# Patient Record
Sex: MALE | Race: BLACK OR AFRICAN AMERICAN | NOT HISPANIC OR LATINO | Employment: UNEMPLOYED | RURAL
[De-identification: names, ages, dates, MRNs, and addresses within clinical notes are randomized per-mention and may not be internally consistent; named-entity substitution may affect disease eponyms.]

---

## 2024-01-27 ENCOUNTER — HOSPITAL ENCOUNTER (EMERGENCY)
Facility: HOSPITAL | Age: 24
Discharge: HOSPICE | End: 2024-01-27
Attending: FAMILY MEDICINE

## 2024-01-27 VITALS
HEIGHT: 69 IN | WEIGHT: 154.63 LBS | TEMPERATURE: 98 F | HEART RATE: 88 BPM | SYSTOLIC BLOOD PRESSURE: 124 MMHG | DIASTOLIC BLOOD PRESSURE: 74 MMHG | OXYGEN SATURATION: 99 % | BODY MASS INDEX: 22.9 KG/M2 | RESPIRATION RATE: 18 BRPM

## 2024-01-27 DIAGNOSIS — S30.812A PENIS ABRASION, INITIAL ENCOUNTER: Primary | ICD-10-CM

## 2024-01-27 PROCEDURE — 99284 EMERGENCY DEPT VISIT MOD MDM: CPT | Mod: ,,, | Performed by: FAMILY MEDICINE

## 2024-01-27 PROCEDURE — 63600175 PHARM REV CODE 636 W HCPCS: Performed by: FAMILY MEDICINE

## 2024-01-27 PROCEDURE — 96372 THER/PROPH/DIAG INJ SC/IM: CPT | Performed by: FAMILY MEDICINE

## 2024-01-27 PROCEDURE — 99284 EMERGENCY DEPT VISIT MOD MDM: CPT

## 2024-01-27 RX ORDER — CEFTRIAXONE 1 G/1
1 INJECTION, POWDER, FOR SOLUTION INTRAMUSCULAR; INTRAVENOUS
Status: COMPLETED | OUTPATIENT
Start: 2024-01-27 | End: 2024-01-27

## 2024-01-27 RX ORDER — BACITRACIN ZINC 500 UNIT/G
OINTMENT (GRAM) TOPICAL 2 TIMES DAILY
Qty: 15 G | Refills: 0 | Status: SHIPPED | OUTPATIENT
Start: 2024-01-27 | End: 2024-02-08

## 2024-01-27 RX ORDER — DOXYCYCLINE 100 MG/1
100 CAPSULE ORAL 2 TIMES DAILY
Qty: 12 CAPSULE | Refills: 0 | Status: SHIPPED | OUTPATIENT
Start: 2024-01-27 | End: 2024-02-02

## 2024-01-27 RX ORDER — CLOTRIMAZOLE 1 %
CREAM (GRAM) TOPICAL 2 TIMES DAILY
Qty: 12 G | Refills: 0 | Status: SHIPPED | OUTPATIENT
Start: 2024-01-27 | End: 2024-02-08

## 2024-01-27 RX ADMIN — CEFTRIAXONE 1 G: 1 INJECTION, POWDER, FOR SOLUTION INTRAMUSCULAR; INTRAVENOUS at 01:01

## 2024-01-27 NOTE — ED TRIAGE NOTES
Pt ambulatory to er with c/o penile issues- pt denies drainage and burning on  urination - pt c/o irritation to penis - pt has not been circumcised- pt states he has been cleaning with soap and water- pt states he has one partner that is female- pt states he was treated for std 's 3 years ago- dr huntley in to examine pt

## 2024-01-27 NOTE — ED PROVIDER NOTES
Encounter Date: 1/27/2024       History   No chief complaint on file.    23 year old male presents to ER via POV for abrasions to the penis. No urethral discharge, no dysuria, no hematuria.    The history is provided by the patient. No  was used.     Review of patient's allergies indicates:  No Known Allergies  No past medical history on file.  No past surgical history on file.  No family history on file.     Review of Systems   Genitourinary:  Positive for penile pain. Negative for decreased urine volume, dysuria, penile swelling, scrotal swelling and testicular pain.   Skin:  Positive for rash.   All other systems reviewed and are negative.      Physical Exam     Initial Vitals [01/27/24 1345]   BP Pulse Resp Temp SpO2   124/74 87 18 97.8 °F (36.6 °C) 100 %      MAP       --         Physical Exam    Nursing note and vitals reviewed.  Constitutional: He appears well-developed and well-nourished.   HENT:   Head: Normocephalic and atraumatic.   Right Ear: External ear normal.   Left Ear: External ear normal.   Eyes: Conjunctivae and EOM are normal. Pupils are equal, round, and reactive to light.   Neck: Neck supple.   Normal range of motion.  Cardiovascular:  Normal rate and regular rhythm.           Pulmonary/Chest: Breath sounds normal.   Abdominal: Abdomen is soft. Bowel sounds are normal.   Genitourinary: No discharge found.    Genitourinary Comments: Multiple superficial abrasions to distal end of penis.     Musculoskeletal:         General: Normal range of motion.      Cervical back: Normal range of motion and neck supple.     Neurological: He is alert and oriented to person, place, and time. He has normal strength and normal reflexes. GCS score is 15. GCS eye subscore is 4. GCS verbal subscore is 5. GCS motor subscore is 6.   Skin: Skin is warm and dry. Capillary refill takes less than 2 seconds.   Psychiatric: He has a normal mood and affect. His behavior is normal. Thought content normal.          Medical Screening Exam   See Full Note    ED Course   Procedures  Labs Reviewed - No data to display       Imaging Results    None          Medications   cefTRIAXone injection 1 g (has no administration in time range)     Medical Decision Making  23 y o male with multiple superficial abrasions to distal half of penis following unprotected sex.    Amount and/or Complexity of Data Reviewed  Discussion of management or test interpretation with external provider(s): Rx- Doxycycline po, clotrimazole topical, bacitracin topical    Risk  OTC drugs.  Prescription drug management.                                      Clinical Impression:   Final diagnoses:  [S30.363M] Penis abrasion, initial encounter (Primary)        ED Disposition Condition    Discharge Stable          ED Prescriptions       Medication Sig Dispense Start Date End Date Auth. Provider    doxycycline (VIBRAMYCIN) 100 MG Cap Take 1 capsule (100 mg total) by mouth 2 (two) times daily. for 6 days 12 capsule 1/27/2024 2/2/2024 Autumn Domínguez MD    clotrimazole (LOTRIMIN) 1 % cream Apply topically 2 (two) times daily. for 12 days 12 g 1/27/2024 2/8/2024 Autumn Domínguez MD    bacitracin 500 unit/gram Oint Apply topically 2 (two) times daily. for 12 days 15 g 1/27/2024 2/8/2024 Autumn Domínguez MD          Follow-up Information       Follow up With Specialties Details Why Contact Info    Chasidy Harris MD Family Medicine In 3 days For wound re-check [address] [facility] [place] [address] [phone]               Autumn Domínguez MD  01/27/24 3570